# Patient Record
Sex: FEMALE | Race: WHITE | NOT HISPANIC OR LATINO | URBAN - METROPOLITAN AREA
[De-identification: names, ages, dates, MRNs, and addresses within clinical notes are randomized per-mention and may not be internally consistent; named-entity substitution may affect disease eponyms.]

---

## 2023-11-08 ENCOUNTER — TELEPHONE (OUTPATIENT)
Dept: TRANSPLANT | Facility: CLINIC | Age: 38
End: 2023-11-08

## 2023-11-08 ENCOUNTER — DOCUMENTATION ONLY (OUTPATIENT)
Dept: TRANSPLANT | Facility: CLINIC | Age: 38
End: 2023-11-08

## 2023-11-08 NOTE — TELEPHONE ENCOUNTER
"Voucher: Wants More Info Registered As: Standard Voucher Donor  Donor Intake Start: 23 Donor Intake Complete: 10/26/23  Gender: Female Preferred Language: English  Full Name: Kinza Chua Is  Needed: [not answered]  E-mail: [email or web address] Phone Number: [phone]  Secondary Phone:  Contact Preference: [not answered] Best Contact Time: 10am - 3pm  Emergency Contact: Dallas Harshil Emergency Contact #: [phone]  Relationship to Contact: Contact is my spouse  : 85 Age: 38  Address[address] City: [place]  State: [place] Postal Code: [id number]  Height: 5'8\" Weight: 185lbs  BMI: 28.1  Employment Status: Employed Has PTO for donation? No, not reimbursed  Occupation:  and Massage Therapist Requires Heavy Lifting? No  Education Level: Advanced Degree Marital Status:   Exercise Routine: Occasional Health Insurance: Yes  Blood Type: Unknown Ethnicity/Race: White  Donor Type: Standard Voucher Donor  Prefer Remote Donation: Yes  Physician:  Marivel Thompson<br/>[place],  [place]  Donating for Local Recipient  Recipient's Name: Vivian Laguna Recipient's : [phone]  Recipient's Status: Patient not on dialysis but  needs a transplant soon.  How Candidate Knows Recip: Friend  Candidate communicates w/  Recip: Less Than Once A Month  Possible Interest In:  Motivation to donate: I lived with Vivian for several years and offered to help if I am able.  Living Donor Pre-Screening  [place]  Will accept blood transfusions? Yes  Has been diagnosed with kidney disease? No  Has had a heart attack? No  Has Diabetes (High BGs)? No  Has had cancer? No  Has had kidney stones? No  Has ever been pregnant? Yes   - Is Currently Pregnant? No   - Months Since Pregnant? 24+   - Is Currently Nursing? No   - Had gestational diabetes? No   - Hypertension during pregnancy? Never  Is Planning on Pregnancy? No  Is Taking Birth Control? Yes   - Birth Control Months? 84   - BirthControlForm? " IUD   - Birth Control Complications? No   - Is Able To Stop Birth Control? No  Has Used Tobacco? Yes   - currently uses tobacco: No   - will stop for surgery: N/A   - how many years: 6   - tobacco use (packs/cans) / frequency: 1 / Monthly  Has HIV? No  Is Currently Incarcerated? No  Is Currently Residing in [place] Yes  History Misc  Has Allergies? No  Has had Surgeries? No  Takes Medication? Yes  Medication Dose Frequency  Paxil 5mg Daily  Hydroxyzine 25mg As needed  Maloxicam 15mg As needed  Medical History  History of High BP? Never  History of CABG (bypass surgery)? No  History of blood clots? Never  History of coronary disease? Never  History of high cholesterol or triglycerides? Never  Has stents implanted? No  History of chest pain during exercise? No  History of chest pain at other times? No  Results of climbing 2 flights of stairs? No Problem  Had stress test in last year? No  Has had stroke? No  Has had leg bypass? No  History of lung disease? Never  History of COPD? Never  History of TB? Never  History of Pneumonia? Never  Has respiratory issues? No  Has HIV? No  Has Gastro Issues? No  History of Gallstones? Never  History of Pancreatitis? Never  History of Liver Disease? Never  History of Hepatitis B? Never  History of Hepatitis C? Never  History of bleeding problems? Never  History of UTIs? Yes   - UTI episodes: 1   - years since last UTI: 5 years  History of kidney damage? Never  History of Proteinuria? Never  History of Hematuria? Never  History of neuro disease? Never  History of seizure? Never  History of lupus? Never  History of paralysis? Never  History of arthritis? Still being treated  History of neuropathy? Never  History of depression? Never  History of anxiety? Still being treated  History of documented psychiatric illness? Never  History of Fibroid Uterus? Never  History of Endometriosis? Never  History of Polycystic Ovaries? Never  Has had Miscarriages? No  Has had abortions? No  Has had  transfusions? No  History of Obesity? No  History of Fabry's Disease? No  History of Sickle Cell Disease? No  History of Sickle Cell Trait? No  History of Sarcoidosis? No  Has auto-immune disease? No  Has had Physical Exam? No  Has had Mammogram? No  Has had Pap Smear? Yes   - how many years ago: 1  Colonoscopy? No  Medical history comments? [no comments]  Living Donor Family Medical History  Anyone with kidney disease? Unknown  Anyone with liver disease? Unknown  Anyone with heart disease? Yes   - which family members: Grandfather  Anyone with coronary artery disease? Unknown  Anyone with high blood pressure? Yes   - which family members: Dad  Anyone with blood disorder? Unknown  Anyone with cancer? No  Anyone with kidney cancer? No  Anyone with diabetes? Yes   - which family members: Grandmother  Is mother alive? Yes  Mother's age? 67  Is father alive? Yes  Father's age? 67  How many siblings? 1  How many adult children? 0  How many children under 18? 2  Social History  Has Used Alcohol? Yes   - currently uses alcohol: Yes   - how much: 10/Weekly  Has Abused Alcohol? No  Has Used Drugs? No  Has had legal issues w/ law enforcement? No  Traveled over 100 miles from home in last year? Yes   - Traveled Where? [place]  Has had suicidal thoughts or attempts in the last five years? No

## 2023-11-09 ENCOUNTER — TELEPHONE (OUTPATIENT)
Dept: TRANSPLANT | Facility: CLINIC | Age: 38
End: 2023-11-09

## 2024-01-03 ENCOUNTER — TELEPHONE (OUTPATIENT)
Dept: TRANSPLANT | Facility: CLINIC | Age: 39
End: 2024-01-03

## 2024-01-03 NOTE — TELEPHONE ENCOUNTER
I called Kinza to review options for donation as an advance voucher donor.  I reviewed she could do a second voucher for her friend Vivian Laguna.  I reviewed typical graft survival and our process for listing and finding matches for our recipients.  I reviewed next step would be an [facility] call and then I will regroup with her regarding her interest in doing donor services and care at [facility].  I reviewed enhance voucher designation would be recommended since the need for a second kidney is unknown.  I send patient information on advance voucher program and the consent form.  Kinza stated she would like to proceed to the next step.  She stated one concern she has of herself being a donor is her chronic shoulder pain and that she occasionally takes Maloxicam and ibuprofen.  She would like to make her final decision after all the testing is completed.  I will set her up for the [facility] call.  Patient stated understanding and is in agreement to the plan of care.  Jacinda Jackson RN  Living Donor Coordinator  01/03/2024 12:22 PM

## 2024-04-04 ENCOUNTER — DOCUMENTATION ONLY (OUTPATIENT)
Dept: TRANSPLANT | Facility: CLINIC | Age: 39
End: 2024-04-04

## 2024-04-04 ENCOUNTER — TELEPHONE (OUTPATIENT)
Dept: TRANSPLANT | Facility: CLINIC | Age: 39
End: 2024-04-04

## 2024-04-04 NOTE — PROGRESS NOTES
Kinza,   Hope this note finds you well.  We are wondering if you are still interested in  the next step toward kidney donation?  The next step would be talking to our independent donor advocate. I could set you up to speak to one of them.  Let us know what works for you?    The Donor Team